# Patient Record
Sex: FEMALE | Race: WHITE | URBAN - METROPOLITAN AREA
[De-identification: names, ages, dates, MRNs, and addresses within clinical notes are randomized per-mention and may not be internally consistent; named-entity substitution may affect disease eponyms.]

---

## 2018-08-14 ENCOUNTER — TELEPHONE (OUTPATIENT)
Dept: CARDIOLOGY | Facility: MEDICAL CENTER | Age: 78
End: 2018-08-14

## 2018-08-14 NOTE — TELEPHONE ENCOUNTER
would RO see this pt??   Received: Today   Message Contents   Sherron Huynh R.N.   Phone Number: [phone]             LUPE/christian Romero calling from Dr Tyson's office to ask if RO would see this pt who has a diagnosis of atrial flutter requiring an EP evaluation.     Pt also has had PSVT, for possible ablation.       Please call Heather at       Confirmed with EP department, patient can be directly referred to EP.     Contacted Heather explained to submit referral to electrophysiology. Heather verbalized understanding.